# Patient Record
Sex: MALE | Race: WHITE | URBAN - METROPOLITAN AREA
[De-identification: names, ages, dates, MRNs, and addresses within clinical notes are randomized per-mention and may not be internally consistent; named-entity substitution may affect disease eponyms.]

---

## 2021-10-14 ENCOUNTER — OFFICE VISIT (OUTPATIENT)
Dept: URBAN - METROPOLITAN AREA CLINIC 43 | Facility: CLINIC | Age: 46
End: 2021-10-14
Payer: COMMERCIAL

## 2021-10-14 DIAGNOSIS — H01.009 BLEPHARITIS OF EYELID: Primary | ICD-10-CM

## 2021-10-14 DIAGNOSIS — H04.123 TEAR FILM INSUFFICIENCY OF BILATERAL LACRIMAL GLANDS: ICD-10-CM

## 2021-10-14 PROCEDURE — 92004 COMPRE OPH EXAM NEW PT 1/>: CPT | Performed by: OPTOMETRIST

## 2021-10-14 ASSESSMENT — KERATOMETRY
OS: 41.00
OD: 40.88

## 2021-10-14 ASSESSMENT — VISUAL ACUITY
OS: 20/20
OD: 20/20

## 2021-10-14 ASSESSMENT — INTRAOCULAR PRESSURE
OD: 19
OS: 21

## 2021-10-14 NOTE — IMPRESSION/PLAN
Impression: Blepharitis of eyelid: H01.009. Plan: Blepharitis accounts for the patient's complaints. The condition appears chronic and eyelid scrubs with ointment have been suggested.  The patient understands this may not cure the condition and that there may be the need to be on a maintenance program.